# Patient Record
Sex: FEMALE | Race: WHITE | NOT HISPANIC OR LATINO
[De-identification: names, ages, dates, MRNs, and addresses within clinical notes are randomized per-mention and may not be internally consistent; named-entity substitution may affect disease eponyms.]

---

## 2022-05-17 PROBLEM — Z00.00 ENCOUNTER FOR PREVENTIVE HEALTH EXAMINATION: Status: ACTIVE | Noted: 2022-05-17

## 2022-05-20 ENCOUNTER — APPOINTMENT (OUTPATIENT)
Dept: PAIN MANAGEMENT | Facility: CLINIC | Age: 67
End: 2022-05-20

## 2023-02-21 ENCOUNTER — APPOINTMENT (OUTPATIENT)
Dept: HEMATOLOGY ONCOLOGY | Facility: CLINIC | Age: 68
End: 2023-02-21

## 2023-02-24 NOTE — DISCUSSION/SUMMARY
[FreeTextEntry1] : REASON FOR CONSULT\par Marilin Ruiz is a 67-year-old female referred by Dr. Connor Ngo for cancer genetic counseling and risk assessment due to personal and family history of cancer. Ms. Ruiz was seen on 2023 at which time medical and family history was ascertained and a pedigree constructed. \par \par RELEVANT MEDICAL HISTORY\par Ms. Ruiz was diagnosed with left breast cancer in  at age 45. 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 Consequently, she underwent a left breast lumpectomy, radiation therapy, tamoxifen and Arimidex. 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 She reported that no treatment was needed. \par \par OTHER MEDICAL AND SURGICAL HISTORY:\par •	Medical History: uterine fibroids, cervical radiculopathy, carpal tunnel syndrome\par •	Surgical History: supracervical hysterectomy with right salpingo-oophorectomy d/t fibroids and cysts, right unilateral breast reduction, left breast lumpectomy, right breast excision d/t suspicious cells\par \par OB/GYN HISTORY:\par Obstetrical History: \par Age at Menarche: 14\par Menopausal Status: Postmenopausal, at age 47 with KATLYN/USO\par Age at First Live Birth: 24\par Oral Contraceptive Use: pill use reported for several years\par Hormone Replacement Therapy: None\par \par CANCER SCREENING HISTORY:  \par Breast: \par •	Mammography: annual, most recent reported in summer 2022, negative\par •	Sonography: annual, most recent reported in 2023, negative\par •	MRI: None since diagnosis\par •	Biopsies: None since diagnosis\par GYN:\par •	Pelvic Examination: previously annual exams, reported that she was told she did not need regular exams after age 67 \par o	Patient reported h/o fibroids and an ovarian cyst, no other gynecologic concerns\par Colon:\par •	Colonoscopy: reported 10-year frequency, most recent on 2022\par o	Patient reported no h/o polyps \par •	Upper Endoscopy: 2020, follow-up of Parker’s esophagus\par o	Detected one gastric polyp, pathology revealed well differentiated neuroendocrine tumor\par Skin:  \par •	FBSE: annual, most recent exam reported within past year\par •	Lesions biopsied/removed: patient reported h/o several benign removals\par \par SOCIAL HISTORY:\par •	Tobacco-product use: Formerly smoked for 20 years\par •	Environmental exposures: None\par \par FAMILY HISTORY:\par Maternal ancestry was reported as Japanese, [place]ian and paternal ancestry was reported as Bah[place]ian, [place], [place]. A detailed family history of cancer was ascertained, see below and scanned chart for pedigree. \par \par To Ms. Ruiz’s knowledge no one in the family has had germline testing for cancer susceptibility. Ashkenazi Zoroastrian ancestry was denied. Consanguinity was denied. \par 	\par RISK ASSESSMENT:\par Ms. Ruiz’s personal and family history is suggestive of a hereditary cancer syndrome given her breast cancer diagnosis at age 45, her paternal aunt’s breast cancer diagnosis in her early 50s, and her maternal grandmother’s breast cancer in her early 80s. The patient meets National Comprehensive Cancer Network (NCCN) criteria for genetic testing. We recommended genetic testing for genes associated with breast and gynecological cancer. This test analyzes 19 genes: PRIYANK, BARD1, BRCA1, BRCA2, BRIP1, CDH1, CHEK2, EPCAM, MLH1, MSH2, MSH6, NF1, PALB2, PMS2, PTEN, RAD51C, RAD51D, STK11, and TP53.\par \par The risks, benefits and limitations of genetic testing were discussed with Ms. Ruiz. In addition, we discussed the purpose of genetic testing and possible test results (positive, negative, inconclusive) along with associated medical management options and psychosocial implications. Insurance coverage and potential out of pocket costs were also discussed. \par \par It was explained that risk assessment is based upon medical and family history as provided and may change in the future should new information be obtained. \par \par Following our discussion, Ms. Ruiz consented to the above-mentioned genetic testing panel. Blood was drawn in our laboratory and sent to [facility] today.\par \par PLAN:\par \par 1.	Blood drawn today will be sent to [facility] for analysis. \par 2.	We will contact Ms. Ruiz to schedule a follow-up appointment once the results are available. Results generally return in 2-3 weeks. \par 3.             Ms. Ruiz signed a medical release form for Dr. Connor Ngo in order for us to fax her genetic test report to him. \par \par For any additional questions please call Cancer Genetics at [phone]. \par \par \par Katya Gann MS, [facility] [place] Genetic Counselor, Cancer Genetics

## 2023-03-01 ENCOUNTER — NON-APPOINTMENT (OUTPATIENT)
Age: 68
End: 2023-03-01

## 2023-03-01 NOTE — DISCUSSION/SUMMARY
[FreeTextEntry1] : REASON FOR CONSULT\par Marilin Ruiz is a 67-year-old female who was contacted on 3/1/2023 for a discussion regarding her negative genetic testing results related to hereditary cancer predisposition. This session was conducted via telephone.\par \par Ms. Ruiz was originally seen by the Cancer Genetics Service on 2/21/2023 for hereditary cancer predisposition risk assessment due to a personal and family history of cancer. At that time, Ms. Ruiz decided to pursue genetic testing for genes associated with breast and gynecologic cancer offered by [facility]par \par TEST RESULTS: NEGATIVE\par NO pathogenic (disease-causing) variants or variants of uncertain significance were detected in any of the following genes (19): PRIYANK, BARD1, BRCA1, BRCA2, BRIP1, CDH1, CHEK2, EPCAM, MLH1, MSH2, MSH6, NF1, PALB2, PMS2, PTEN, RAD51C, RAD51D, STK11, and TP53.\par \par RESULTS INTERPRETATION AND ASSESSMENT:\par Given Ms. Ruiz’s personal and current reported family history of cancer, and her negative genetic test results, the following screening guidelines and risk-reducing recommendations were discussed:\par \par BREAST: \par •	Long-term management and surveillance should be based on Ms. Ruiz’s on- or post-treatment protocol as recommended by her oncologist.\par \par OTHER:\par •	In the absence of other indications, Ms. Ruiz should practice age-appropriate cancer screening of other organ systems as recommended for the general population.\par \par We also discussed the limitations of negative results:\par 1.	The cause of Ms. Ruiz’s personal and family history of cancer remains unknown. The cancer(s) may have developed randomly, or due to environmental factors.  \par 2.	This negative result does not completely rule out a hereditary basis for the reported personal and/or family history due to limitations in technology or a variant being present in an unidentified gene. \par 3.	Variants in other genes would not be identified by this analysis, so this negative result does not rule out the likelihood of having a mutation in a different hereditary cancer gene or the possibility of ever developing cancer.\par 4.	It is possible there is a hereditary cancer predisposition gene mutation in the family, but the patient did not inherit it. \par \par We informed Ms. Ruiz that our knowledge of genetics and inherited cancer conditions is changing rapidly. Therefore, we recommended that Ms. Ruiz contact our office, every 2 to 3 years, to discuss relevant advances in cancer genetics.  We emphasized the importance of re-contacting us with updates regarding her personal and family history of cancer as well as any updates regarding additional cancer genetic test results performed for the patient and/or family members.  Such updates could possibly change our risk assessment and recommendations. \par \par In addition, we discussed Ms. Ruiz’s brother can consider pursuing cancer risk assessment genetic counseling with the option of genetic testing given the family history.\par \par PLAN:\par 1.	These results do not change Ms. Ruiz’s medical management. Long-term management and surveillance should be based on the patient’s on- or post-treatment protocol as recommended by her oncologist (and general population guidelines for other cancers).\par 2.	Patient informed consult note(s) will be available through their [facility] patient portal and genetic test results will be released via [facility]’s portal.\par 3.	Ms. Ruiz was encouraged to contact us every 2-3 years to discuss relevant advances in cancer genetics, or sooner if there are any changes in her personal or family history of cancer.\par \par \par For any additional questions please call Cancer Genetics at [phone]. \par \par \par Katya Gann MS, [facility] [place]\par Genetic Counselor, Cancer Genetics

## 2023-04-13 ENCOUNTER — TRANSCRIPTION ENCOUNTER (OUTPATIENT)
Age: 68
End: 2023-04-13

## 2023-04-17 ENCOUNTER — TRANSCRIPTION ENCOUNTER (OUTPATIENT)
Age: 68
End: 2023-04-17

## 2025-07-18 ENCOUNTER — TRANSCRIPTION ENCOUNTER (OUTPATIENT)
Age: 70
End: 2025-07-18